# Patient Record
(demographics unavailable — no encounter records)

---

## 2024-10-07 NOTE — REASON FOR VISIT
[Home] : at home, [unfilled] , at the time of the visit. [Medical Office: ([facility])___] : at the medical office located in  [Spouse] : spouse [Patient] : the patient [Initial Evaluation] : an initial evaluation

## 2024-10-07 NOTE — REASON FOR VISIT
[Home] : at home, [unfilled] , at the time of the visit. [Medical Office: ([facility])___] : at the medical office located in  [Spouse] : spouse [Patient] : the patient [Initial Evaluation] : an initial evaluation

## 2024-10-10 NOTE — HISTORY OF PRESENT ILLNESS
[FreeTextEntry1] : 68yoM with Stage IV colorectal cancer presents for follow up palliative care visit.  He is accompanied by his wife, Luisa. Visit done via telemedicine as patient is unable to come to office, has been bedbound.  PMH significant for DM, HTN, HLD, anxiety/depression, bradycardia, diverticulosis, DVT, kidney stones, bladder stones, BPH, chronic UTIs on daily cephalexin.   The patient was also evaluated for a slightly elevated PSA and underwent multiple prostate biopsies on July 29, 2019. The biopsy. The pathology revealed moderately differentiated adenocarcinoma of the colon with mucinous features and 60% of the core involved by tumor. The immunostains revealed CK 20 positive, CD X2 positive, CK 7 negative, PSA negative. There were 10 core biopsies which showed mucinous adenocarcinoma from the rectum. The patient subsequently has undergone surgical evaluation and a distal rectal mass was palpated c/w carcinoma. It was recommended that the patient undergo testing with CAT scans, MRI and colonoscopy.    Is now on treatment with fruquintinib which he reports tolerating well.   Pt was hospitalized 6/2023 and since he got back home from that hospitalization he has been "bedridden."  He received a hospital bed two weeks ago and required a asif lift to transfer him into it. Wife has found that since he has been on the first floor of the house in a different environment his mood has improved.   He experiences pain in his abdomen. He uses Tylenol 1000mg BID, which at times helps, at times does not help.  Appetite is quite low, he drinks 2 Boost drinks daily. He was previously prescribed dronabinol 2.5mg but only took it once and wife reports he was loopy on it, did not want to try it again. He and his wife are interested in medicinal cannabis for the purposes of appetite stimulation.   Interval history (10/7/24): Patient seen for follow up palliative care visit via telemedicine, accompanied by his wife.  He was hospitalized at [facility] 9/4-9/19. During his admission his CT revealed evidence of increasing invasion into bladder and prostate by L rectal mass w/ possible fistulization along with new pathologic T11 vertebral body fracture and invasion into spinal canal.  He notes ongoing pain to his left lower abdomen, as well as lower back pain related to the compression fracture. The severity of the pain varies from day to day, it can reach 7/10. He manages the pain with PRN Tylenol 1000mg, twice a day per day and PRN hydromorphone 2mg, half a tablet per dose twice a day. This allows him to tolerate the pain.  Appetite is poor, eating small frequent meals. Supplementing with one Ensure a day, he has difficulty tolerating it. He is using medical cannabis 1:1 tincture 4.5mgthc and 4.5 mg cbd/0.5 ml tincture before meals. His current dose is 0.2mL per dose; he does not appreciate any affect on his appetite. Hydrating well. Ostomy functioning well.  He remains bedbound. He has a visiting nurse once a week and an aid 4 hours a day, 5 days a week. His wife reports he is beginning to get skin breakdown to his backside. She encourages him to change position however he is hesitant as moving flares his pain. He reports anxiety surrounding pain levels increasing.  He is no longer receiving DMT. Hospice services were discussed with him and his wife during his recent inpatient stay. He reports a good understanding of what is offered through the hospice program and defers hospice services at this time.   ROS: +generalized weakness, bedbound +ostomy for stool - empties well, no constipation.  +indwelling gonzalez catheter +pressure sores - being monitored by visiting nurse. daily dressing changes by wife.   +low mood, worries a lot Denies trouble sleeping,   Has HHA through home care, 20 hours/week.   Patient is , lives with his wife. No children.   Used to enjoy watching sports of all kinds, has less interest in doing so lately. He enjoys having visitors come by. Pt states he believes he is on the right path to "full health."   I-STOP Ref#: [id number]

## 2024-10-10 NOTE — END OF VISIT
[Time Spent: ___ minutes] : I have spent [unfilled] minutes of time on the encounter which excludes teaching and separately reported services. [FreeTextEntry3] : Agree with NP assessment and plan as outlined above.

## 2024-10-10 NOTE — DATA REVIEWED
[FreeTextEntry1] : MR SPINE LUMBAR WAW IC   (09/09/2024):   INTERPRETATION:  CLINICAL INFORMATION: Colon cancer with metastases  ADDITIONAL CLINICAL INFORMATION: Not Applicable  TECHNIQUE: Multiplanar, multisequence MRI was performed of the lumbar  spine T10-11 through S4. IV Contrast: Gadavist  10 cc administered   0 cc discarded  PRIOR STUDIES: Abdominal CT 6/21/2024.  The visualized thoracic and lumbosacral vertebral bodies are normal in  height with the exception of T11 demonstrates mild loss of height which  is new since 6/21/2024. There is abnormal signal in the T11, T12 L3, L4  and S1 bodies consistent with metastasis. There is enhancing soft tissue  in the presacral space which is unchanged since the prior exam consistent  with neoplasm. There is abnormal signal in the left iliac crest.  After contrast administration there is heterogeneous enhancement of the  vertebral bodies. There is also enhancement of the ventral and dorsal  dura from T10 through T12. This could represent epidural extension of  neoplasm. Recommend further evaluation of the thoracic spine with and  without contrast. Mild degenerative changes of the lumbar spine are noted.  IMPRESSION: Osseous metastasis. Presacral mass as on CT 6/21/2024. Mild  loss of height of T11. Ventral and dorsal epidural extension T10-T12.  Recommend further evaluation of the thoracic spine with contrast.  ---- CT ABDOMEN AND PELVIS IC  (09/04/2024):   COMPARISON: CT abdomen/pelvis 6/21/2024, 4/17/2024  FINDINGS: LOWER CHEST: Coronary artery calcifications. Partially visualized  catheter tip in the distal SVC. Partially imaged mediastinal and  bilateral hilar adenopathy. A partially imaged subcarinal node (series  17, image 1), measures 2.5 x 1.6 cm. Increased size and number of diffuse  bilateral pulmonary metastases. For example: *  New nodule in the medial right middle lobe (series 17, image 4),  measuring 1.7 x 1.6 cm *  Increased size of nodule in the lingula (series 17, image 11),  measuring 1.3 x 1.3 cm, previously 1.0 x 1.0 C  LIVER: Decreased size and density of bilobar hepatic metastases. For  example: *  Segment 4A lesion measures 6.3 x 6.1 cm (17:20), previously 6.8 x 7.0  cm *  Caudate lobe lesion measures 4.5 x 3.9 cm (17:38), previously 6.0 x  4.9 cm BILE DUCTS: Normal caliber. GALLBLADDER: Within normal limits. SPLEEN: Within normal limits. PANCREAS: Within normal limits. ADRENALS: Indeterminate left adrenal nodule, measuring 1.0 cm. Right  adrenal gland is within normal limits. KIDNEYS/URETERS: Unchanged moderate left hydroureteronephrosis to the  level of obstructing pelvic mass. Right lower pole renal cysts and  multiple hypodense lesions too small to characterize. No right  hydronephrosis.  BLADDER: Patino catheter. REPRODUCTIVE ORGANS: Prostate is enlarged.  BOWEL: No bowel obstruction. Appendix is normal. Left lower quadrant  diverting loop colostomy. Left rectus mass is unchanged in size,  measuring 13.6 x 9.2 cm (17:143), but shows increasing foci of air. The  mass is inseparable from the pelvic sidewall, prostate and bladder and  there is new deeper invasion into the left sciatic foramen. No contrast  extravasation to suggest site of active bleeding. PERITONEUM/RETROPERITONEUM: Within normal limits. VESSELS: Atherosclerotic changes. LYMPH NODES: No lymphadenopathy. ABDOMINAL WALL: Postsurgical changes. Bilateral gluteal subcutaneous  edema. BONES: Degenerative changes. New pathologic fracture of the T11 vertebral  body with possible soft tissue mass extending into the spinal canal. Soft  tissue mass associated with the right 11th rib, consistent with osseous  metastases.  IMPRESSION: No evidence of active bleeding.  Left rectal mass inseparable from the pelvic sidewall, prostate and  bladder with increasing foci of air, probably due to fistulous  communication with rectum.  Increased pulmonary metastases.  Decreased size and density of hepatic metastases.  Osseous metastases with new pathologic fracture of the T11 vertebral body  with possible mass extending into the spinal canal.

## 2024-10-10 NOTE — ASSESSMENT
[______] : HCP: [unfilled] [FreeTextEntry1] : 68yoM with:   # Stage IV Colorectal Cancer - No longer receiving DMT. S/p Fruquitinib; Med Onc Follow up.  # Pain 2/2 Neoplasm - c/w PRN Acetaminophen 1000mg q8hr. Continue with PRN hydromorphone 1-2mg q4hrs. Patient likely underutilizing pain medications, recommend dosing prior to turn-and-positioning.  -Educated on the importance of maintaining bowel regularity in light of opioid use.  # Poor appetite - C/w medical cannabis pre-meals. Continue to titrate as tolerated to therapeutic effect. Recommend small, frequent meals focused on high-calorie, high-protein, nutrient dense healthy foods.  # Anxiety/depressed mood - related to illness. Provided patient empathetic listening and validation of his emotions.  # Encounter for palliative care- emotional support provided. Explained the role of palliative care in enhancing quality of life in the setting of serious illness. Hospice benefits discussed in detail, answered all questions to patient's satisfaction. Patient defers at this time. HCP form on file.   Follow up in 2 weeks, call sooner with questions or issues.

## 2024-10-10 NOTE — HISTORY OF PRESENT ILLNESS
[FreeTextEntry1] : 68yoM with Stage IV colorectal cancer presents for follow up palliative care visit.  He is accompanied by his wife, Luisa. Visit done via telemedicine as patient is unable to come to office, has been bedbound.  PMH significant for DM, HTN, HLD, anxiety/depression, bradycardia, diverticulosis, DVT, kidney stones, bladder stones, BPH, chronic UTIs on daily cephalexin.   The patient was also evaluated for a slightly elevated PSA and underwent multiple prostate biopsies on July 29, 2019. The biopsy. The pathology revealed moderately differentiated adenocarcinoma of the colon with mucinous features and 60% of the core involved by tumor. The immunostains revealed CK 20 positive, CD X2 positive, CK 7 negative, PSA negative. There were 10 core biopsies which showed mucinous adenocarcinoma from the rectum. The patient subsequently has undergone surgical evaluation and a distal rectal mass was palpated c/w carcinoma. It was recommended that the patient undergo testing with CAT scans, MRI and colonoscopy.    Is now on treatment with fruquintinib which he reports tolerating well.   Pt was hospitalized 6/2023 and since he got back home from that hospitalization he has been "bedridden."  He received a hospital bed two weeks ago and required a asif lift to transfer him into it. Wife has found that since he has been on the first floor of the house in a different environment his mood has improved.   He experiences pain in his abdomen. He uses Tylenol 1000mg BID, which at times helps, at times does not help.  Appetite is quite low, he drinks 2 Boost drinks daily. He was previously prescribed dronabinol 2.5mg but only took it once and wife reports he was loopy on it, did not want to try it again. He and his wife are interested in medicinal cannabis for the purposes of appetite stimulation.   Interval history (10/7/24): Patient seen for follow up palliative care visit via telemedicine, accompanied by his wife.  He was hospitalized at [facility] 9/4-9/19. During his admission his CT revealed evidence of increasing invasion into bladder and prostate by L rectal mass w/ possible fistulization along with new pathologic T11 vertebral body fracture and invasion into spinal canal.  He notes ongoing pain to his left lower abdomen, as well as lower back pain related to the compression fracture. The severity of the pain varies from day to day, it can reach 7/10. He manages the pain with PRN Tylenol 1000mg, twice a day per day and PRN hydromorphone 2mg, half a tablet per dose twice a day. This allows him to tolerate the pain.  Appetite is poor, eating small frequent meals. Supplementing with one Ensure a day, he has difficulty tolerating it. He is using medical cannabis 1:1 tincture 4.5mgthc and 4.5 mg cbd/0.5 ml tincture before meals. His current dose is 0.2mL per dose; he does not appreciate any affect on his appetite. Hydrating well. Ostomy functioning well.  He remains bedbound. He has a visiting nurse once a week and an aid 4 hours a day, 5 days a week. His wife reports he is beginning to get skin breakdown to his backside. She encourages him to change position however he is hesitant as moving flares his pain. He reports anxiety surrounding pain levels increasing.  He is no longer receiving DMT. Hospice services were discussed with him and his wife during his recent inpatient stay. He reports a good understanding of what is offered through the hospice program and defers hospice services at this time.   ROS: +generalized weakness, bedbound +ostomy for stool - empties well, no constipation.  +indwelling gonzalez catheter +pressure sores - being monitored by visiting nurse. daily dressing changes by wife.   +low mood, worries a lot Denies trouble sleeping,   Has HHA through home care, 20 hours/week.   Patient is , lives with his wife. No children.   Used to enjoy watching sports of all kinds, has less interest in doing so lately. He enjoys having visitors come by. Pt states he believes he is on the right path to "full health."   I-STOP Ref#: [id number]

## 2024-10-10 NOTE — PHYSICAL EXAM
[General Appearance - Alert] : alert [Oriented To Time, Place, And Person] : oriented to person, place, and time [Affect] : the affect was normal [FreeTextEntry1] : lying in hospital bed

## 2024-10-10 NOTE — HISTORY OF PRESENT ILLNESS
[FreeTextEntry1] : 68yoM with Stage IV colorectal cancer presents for follow up palliative care visit.  He is accompanied by his wife, Luisa. Visit done via telemedicine as patient is unable to come to office, has been bedbound.  PMH significant for DM, HTN, HLD, anxiety/depression, bradycardia, diverticulosis, DVT, kidney stones, bladder stones, BPH, chronic UTIs on daily cephalexin.   The patient was also evaluated for a slightly elevated PSA and underwent multiple prostate biopsies on July 29, 2019. The biopsy. The pathology revealed moderately differentiated adenocarcinoma of the colon with mucinous features and 60% of the core involved by tumor. The immunostains revealed CK 20 positive, CD X2 positive, CK 7 negative, PSA negative. There were 10 core biopsies which showed mucinous adenocarcinoma from the rectum. The patient subsequently has undergone surgical evaluation and a distal rectal mass was palpated c/w carcinoma. It was recommended that the patient undergo testing with CAT scans, MRI and colonoscopy.    Is now on treatment with fruquintinib which he reports tolerating well.   Pt was hospitalized 6/2023 and since he got back home from that hospitalization he has been "bedridden."  He received a hospital bed two weeks ago and required a asif lift to transfer him into it. Wife has found that since he has been on the first floor of the house in a different environment his mood has improved.   He experiences pain in his abdomen. He uses Tylenol 1000mg BID, which at times helps, at times does not help.  Appetite is quite low, he drinks 2 Boost drinks daily. He was previously prescribed dronabinol 2.5mg but only took it once and wife reports he was loopy on it, did not want to try it again. He and his wife are interested in medicinal cannabis for the purposes of appetite stimulation.   Interval history (10/7/24): Patient seen for follow up palliative care visit via telemedicine, accompanied by his wife.  He was hospitalized at [facility] 9/4-9/19. During his admission his CT revealed evidence of increasing invasion into bladder and prostate by L rectal mass w/ possible fistulization along with new pathologic T11 vertebral body fracture and invasion into spinal canal.  He notes ongoing pain to his left lower abdomen, as well as lower back pain related to the compression fracture. The severity of the pain varies from day to day, it can reach 7/10. He manages the pain with PRN Tylenol 1000mg, twice a day per day and PRN hydromorphone 2mg, half a tablet per dose twice a day. This allows him to tolerate the pain.  Appetite is poor, eating small frequent meals. Supplementing with one Ensure a day, he has difficulty tolerating it. He is using medical cannabis 1:1 tincture 4.5mgthc and 4.5 mg cbd/0.5 ml tincture before meals. His current dose is 0.2mL per dose; he does not appreciate any affect on his appetite. Hydrating well. Ostomy functioning well.  He remains bedbound. He has a visiting nurse once a week and an aid 4 hours a day, 5 days a week. His wife reports he is beginning to get skin breakdown to his backside. She encourages him to change position however he is hesitant as moving flares his pain. He reports anxiety surrounding pain levels increasing.  He is no longer receiving DMT. Hospice services were discussed with him and his wife during his recent inpatient stay. He reports a good understanding of what is offered through the hospice program and defers hospice services at this time.   ROS: +generalized weakness, bedbound +ostomy for stool - empties well, no constipation.  +indwelling gonzalez catheter +pressure sores - being monitored by visiting nurse. daily dressing changes by wife.   +low mood, worries a lot Denies trouble sleeping,   Has HHA through home care, 20 hours/week.   Patient is , lives with his wife. No children.   Used to enjoy watching sports of all kinds, has less interest in doing so lately. He enjoys having visitors come by. Pt states he believes he is on the right path to "full health."   I-STOP Ref#: [id number]